# Patient Record
Sex: MALE | Race: WHITE | NOT HISPANIC OR LATINO | URBAN - METROPOLITAN AREA
[De-identification: names, ages, dates, MRNs, and addresses within clinical notes are randomized per-mention and may not be internally consistent; named-entity substitution may affect disease eponyms.]

---

## 2017-10-25 ENCOUNTER — RECORDS - HEALTHEAST (OUTPATIENT)
Dept: LAB | Facility: CLINIC | Age: 67
End: 2017-10-25

## 2017-10-25 LAB
CHOLEST SERPL-MCNC: 254 MG/DL
FASTING STATUS PATIENT QL REPORTED: ABNORMAL
HDLC SERPL-MCNC: 53 MG/DL
LDLC SERPL CALC-MCNC: 171 MG/DL
TRIGL SERPL-MCNC: 151 MG/DL

## 2018-04-06 ENCOUNTER — SURGERY - HEALTHEAST (OUTPATIENT)
Dept: SURGERY | Facility: CLINIC | Age: 68
End: 2018-04-06

## 2018-04-06 ENCOUNTER — ANESTHESIA - HEALTHEAST (OUTPATIENT)
Dept: SURGERY | Facility: CLINIC | Age: 68
End: 2018-04-06

## 2018-04-06 ENCOUNTER — COMMUNICATION - HEALTHEAST (OUTPATIENT)
Dept: SCHEDULING | Facility: CLINIC | Age: 68
End: 2018-04-06

## 2018-04-06 ASSESSMENT — MIFFLIN-ST. JEOR: SCORE: 1563.35

## 2018-04-07 ASSESSMENT — MIFFLIN-ST. JEOR: SCORE: 1537.38

## 2018-04-08 ASSESSMENT — MIFFLIN-ST. JEOR: SCORE: 1584.1

## 2018-10-26 ENCOUNTER — RECORDS - HEALTHEAST (OUTPATIENT)
Dept: LAB | Facility: CLINIC | Age: 68
End: 2018-10-26

## 2018-10-26 LAB
CHOLEST SERPL-MCNC: 250 MG/DL
FASTING STATUS PATIENT QL REPORTED: ABNORMAL
HDLC SERPL-MCNC: 53 MG/DL
LDLC SERPL CALC-MCNC: 141 MG/DL
TRIGL SERPL-MCNC: 281 MG/DL

## 2019-03-20 ENCOUNTER — RECORDS - HEALTHEAST (OUTPATIENT)
Dept: LAB | Facility: CLINIC | Age: 69
End: 2019-03-20

## 2019-03-20 LAB
CHOLEST SERPL-MCNC: 211 MG/DL
FASTING STATUS PATIENT QL REPORTED: ABNORMAL
HDLC SERPL-MCNC: 58 MG/DL
LDLC SERPL CALC-MCNC: 108 MG/DL
TRIGL SERPL-MCNC: 226 MG/DL

## 2020-08-25 ENCOUNTER — RECORDS - HEALTHEAST (OUTPATIENT)
Dept: LAB | Facility: CLINIC | Age: 70
End: 2020-08-25

## 2020-08-25 LAB
CHOLEST SERPL-MCNC: 204 MG/DL
FASTING STATUS PATIENT QL REPORTED: ABNORMAL
HDLC SERPL-MCNC: 44 MG/DL
LDLC SERPL CALC-MCNC: 105 MG/DL
LDLC SERPL CALC-MCNC: ABNORMAL MG/DL
TRIGL SERPL-MCNC: 525 MG/DL

## 2020-09-09 ENCOUNTER — COMMUNICATION - HEALTHEAST (OUTPATIENT)
Dept: UROLOGY | Facility: CLINIC | Age: 70
End: 2020-09-09

## 2020-09-09 ENCOUNTER — SURGERY - HEALTHEAST (OUTPATIENT)
Dept: SURGERY | Facility: CLINIC | Age: 70
End: 2020-09-09

## 2020-09-09 ENCOUNTER — ANESTHESIA - HEALTHEAST (OUTPATIENT)
Dept: SURGERY | Facility: CLINIC | Age: 70
End: 2020-09-09

## 2020-09-09 ENCOUNTER — AMBULATORY - HEALTHEAST (OUTPATIENT)
Dept: UROLOGY | Facility: CLINIC | Age: 70
End: 2020-09-09

## 2020-09-09 DIAGNOSIS — N20.1 CALCULUS OF URETER: ICD-10-CM

## 2020-09-15 ENCOUNTER — AMBULATORY - HEALTHEAST (OUTPATIENT)
Dept: UROLOGY | Facility: CLINIC | Age: 70
End: 2020-09-15

## 2020-09-15 ENCOUNTER — AMBULATORY - HEALTHEAST (OUTPATIENT)
Dept: SURGERY | Facility: CLINIC | Age: 70
End: 2020-09-15

## 2020-09-15 DIAGNOSIS — Z11.59 ENCOUNTER FOR SCREENING FOR OTHER VIRAL DISEASES: ICD-10-CM

## 2020-09-15 DIAGNOSIS — N20.1 CALCULUS OF URETER: ICD-10-CM

## 2020-09-18 ENCOUNTER — RECORDS - HEALTHEAST (OUTPATIENT)
Dept: LAB | Facility: CLINIC | Age: 70
End: 2020-09-18

## 2020-09-18 LAB
ANION GAP SERPL CALCULATED.3IONS-SCNC: 10 MMOL/L (ref 5–18)
BUN SERPL-MCNC: 16 MG/DL (ref 8–22)
CALCIUM SERPL-MCNC: 9.5 MG/DL (ref 8.5–10.5)
CHLORIDE BLD-SCNC: 105 MMOL/L (ref 98–107)
CO2 SERPL-SCNC: 27 MMOL/L (ref 22–31)
CREAT SERPL-MCNC: 0.99 MG/DL (ref 0.7–1.3)
GFR SERPL CREATININE-BSD FRML MDRD: >60 ML/MIN/1.73M2
GLUCOSE BLD-MCNC: 73 MG/DL (ref 70–125)
POTASSIUM BLD-SCNC: 5.1 MMOL/L (ref 3.5–5)
SODIUM SERPL-SCNC: 142 MMOL/L (ref 136–145)

## 2020-09-22 ENCOUNTER — AMBULATORY - HEALTHEAST (OUTPATIENT)
Dept: FAMILY MEDICINE | Facility: CLINIC | Age: 70
End: 2020-09-22

## 2020-09-22 DIAGNOSIS — Z11.59 ENCOUNTER FOR SCREENING FOR OTHER VIRAL DISEASES: ICD-10-CM

## 2020-09-24 ENCOUNTER — ANESTHESIA - HEALTHEAST (OUTPATIENT)
Dept: SURGERY | Facility: CLINIC | Age: 70
End: 2020-09-24

## 2020-09-24 ENCOUNTER — COMMUNICATION - HEALTHEAST (OUTPATIENT)
Dept: SCHEDULING | Facility: CLINIC | Age: 70
End: 2020-09-24

## 2020-09-25 ENCOUNTER — SURGERY - HEALTHEAST (OUTPATIENT)
Dept: SURGERY | Facility: CLINIC | Age: 70
End: 2020-09-25

## 2020-09-25 ASSESSMENT — MIFFLIN-ST. JEOR: SCORE: 1611.82

## 2020-10-22 ENCOUNTER — OFFICE VISIT - HEALTHEAST (OUTPATIENT)
Dept: UROLOGY | Facility: CLINIC | Age: 70
End: 2020-10-22

## 2020-10-22 DIAGNOSIS — N20.1 CALCULUS OF URETER: ICD-10-CM

## 2021-05-27 ENCOUNTER — RECORDS - HEALTHEAST (OUTPATIENT)
Dept: ADMINISTRATIVE | Facility: CLINIC | Age: 71
End: 2021-05-27

## 2021-06-01 VITALS — WEIGHT: 189.7 LBS | BODY MASS INDEX: 29.78 KG/M2 | HEIGHT: 67 IN

## 2021-06-05 VITALS — HEIGHT: 68 IN | BODY MASS INDEX: 29.15 KG/M2 | WEIGHT: 192.31 LBS

## 2021-06-05 VITALS — HEIGHT: 68 IN | BODY MASS INDEX: 30.04 KG/M2 | WEIGHT: 198.2 LBS

## 2021-06-11 ENCOUNTER — RECORDS - HEALTHEAST (OUTPATIENT)
Dept: LAB | Facility: CLINIC | Age: 71
End: 2021-06-11

## 2021-06-11 LAB
ANION GAP SERPL CALCULATED.3IONS-SCNC: 12 MMOL/L (ref 5–18)
BUN SERPL-MCNC: 19 MG/DL (ref 8–22)
CALCIUM SERPL-MCNC: 9.3 MG/DL (ref 8.5–10.5)
CHLORIDE BLD-SCNC: 106 MMOL/L (ref 98–107)
CHOLEST SERPL-MCNC: 189 MG/DL
CO2 SERPL-SCNC: 24 MMOL/L (ref 22–31)
CREAT SERPL-MCNC: 0.97 MG/DL (ref 0.7–1.3)
FASTING STATUS PATIENT QL REPORTED: ABNORMAL
GFR SERPL CREATININE-BSD FRML MDRD: >60 ML/MIN/1.73M2
GLUCOSE BLD-MCNC: 88 MG/DL (ref 70–125)
HDLC SERPL-MCNC: 48 MG/DL
LDLC SERPL CALC-MCNC: 91 MG/DL
POTASSIUM BLD-SCNC: 4.2 MMOL/L (ref 3.5–5)
SODIUM SERPL-SCNC: 142 MMOL/L (ref 136–145)
TRIGL SERPL-MCNC: 251 MG/DL

## 2021-06-11 NOTE — ANESTHESIA CARE TRANSFER NOTE
Last vitals:   Vitals:    09/25/20 0852   BP: 102/72   Pulse: 79   Resp: 14   Temp: 36.7  C (98  F)   SpO2: 94%     Patient's level of consciousness is drowsy  Spontaneous respirations: yes  Maintains airway independently: yes  Dentition unchanged: yes  Oropharynx: oropharynx clear of all foreign objects    QCDR Measures:  ASA# 20 - Surgical Safety Checklist: WHO surgical safety checklist completed prior to induction    PQRS# 430 - Adult PONV Prevention: 4558F - Pt received => 2 anti-emetic agents (different classes) preop & intraop  ASA# 8 - Peds PONV Prevention: NA - Not pediatric patient, not GA or 2 or more risk factors NOT present  PQRS# 424 - Gillian-op Temp Management: 4559F - At least one body temp DOCUMENTED => 35.5C or 95.9F within required timeframe  PQRS# 426 - PACU Transfer Protocol: - Transfer of care checklist used  ASA# 14 - Acute Post-op Pain: ASA14B - Patient did NOT experience pain >= 7 out of 10

## 2021-06-11 NOTE — ANESTHESIA CARE TRANSFER NOTE
Last vitals:   Vitals:    09/09/20 1523   BP: 141/70   Pulse: 76   Resp: 18   Temp: 36.4  C (97.6  F)   SpO2: (!) 89%     Patient's level of consciousness is drowsy  Spontaneous respirations: yes  Maintains airway independently: yes  Dentition unchanged: yes  Oropharynx: oropharynx clear of all foreign objects    QCDR Measures:  ASA# 20 - Surgical Safety Checklist: WHO surgical safety checklist completed prior to induction    PQRS# 430 - Adult PONV Prevention: 4558F - Pt received => 2 anti-emetic agents (different classes) preop & intraop  ASA# 8 - Peds PONV Prevention: NA - Not pediatric patient, not GA or 2 or more risk factors NOT present  PQRS# 424 - Gillian-op Temp Management: 4559F - At least one body temp DOCUMENTED => 35.5C or 95.9F within required timeframe  PQRS# 426 - PACU Transfer Protocol: - Transfer of care checklist used  ASA# 14 - Acute Post-op Pain: ASA14B - Patient did NOT experience pain >= 7 out of 10

## 2021-06-11 NOTE — ANESTHESIA PREPROCEDURE EVALUATION
Anesthesia Evaluation      Patient summary reviewed   History of anesthetic complications (PONV)     Airway   Mallampati: II  Neck ROM: full   Pulmonary - negative ROS and normal exam    breath sounds clear to auscultation  (-) not a smoker                         Cardiovascular   Exercise tolerance: > or = 4 METS  (+) , hypercholesterolemia,     (-) murmur  Rhythm: regular  Rate: normal,    no murmur      Neuro/Psych - negative ROS     Endo/Other    (+) obesity (overweight (29.24)),      GI/Hepatic/Renal    (+) GERD,   chronic renal disease (ureteral stone),      Other findings: 9/9/20: Hgb 15.1, Plt 168  9/18/20: Na 142, K 5.1, BUN 16, Cr 0.99      Dental - normal exam                        Anesthesia Plan  Planned anesthetic: general LMA  9/22/20: 2019-nCOV Not Detected      Preop meds per Urology orders (PO tylenol 1000 mg, Ketorolac 15 mg, gabapentin 300 mg).  GA LMA.  Hx of PONV and plan for scopolamine patch, dexamethasone, zofran and TIVA.    ASA 2   Induction: intravenous   Anesthetic plan and risks discussed with: patient  Anesthesia plan special considerations: antiemetics,   Post-op plan: routine recovery

## 2021-06-11 NOTE — ANESTHESIA POSTPROCEDURE EVALUATION
Patient: Gregory J Schouweiler  Procedure(s):  CYSTOSCOPY, WITH STENT EXTRACTION, RIGID URETEROSCOPIC CALCULUS REMOVAL  AND STENT INSERTION (Right)  Anesthesia type: general    Patient location: Phase II Recovery  Last vitals:   Vitals Value Taken Time   /82 9/25/2020 10:15 AM   Temp 36.7  C (98  F) 9/25/2020  8:52 AM   Pulse 66 9/25/2020 10:24 AM   Resp 21 9/25/2020  9:30 AM   SpO2 96 % 9/25/2020 10:24 AM   Vitals shown include unvalidated device data.  Post vital signs: stable  Level of consciousness: awake, alert and oriented  Post-anesthesia pain: pain controlled  Post-anesthesia nausea and vomiting: no  Pulmonary: unassisted, return to baseline  Cardiovascular: stable and blood pressure at baseline  Hydration: adequate  Anesthetic events: no    QCDR Measures:  ASA# 11 - Gillian-op Cardiac Arrest: ASA11B - Patient did NOT experience unanticipated cardiac arrest  ASA# 12 - Gillian-op Mortality Rate: ASA12B - Patient did NOT die  ASA# 13 - PACU Re-Intubation Rate: ASA13B - Patient did NOT require a new airway mgmt  ASA# 10 - Composite Anes Safety: ASA10A - No serious adverse event    Additional Notes:

## 2021-06-11 NOTE — ANESTHESIA POSTPROCEDURE EVALUATION
Patient: Gregory J Schouweiler  Procedure(s):  CYSTOSCOPY, WITH RIGHT URETERAL STENT INSERTION (Right)  Anesthesia type: MAC    Patient location: [facility] Surgical Floor  Last vitals:   Vitals Value Taken Time   /70 9/9/2020  3:23 PM   Temp 36.4  C (97.6  F) 9/9/2020  3:23 PM   Pulse 76 9/9/2020  3:23 PM   Resp 18 9/9/2020  3:23 PM   SpO2 89 % 9/9/2020  3:23 PM     Post vital signs: stable  Level of consciousness: awake and responds to simple questions  Post-anesthesia pain: pain controlled  Post-anesthesia nausea and vomiting: no  Pulmonary: unassisted, return to baseline  Cardiovascular: stable and blood pressure at baseline  Hydration: adequate  Anesthetic events: no    QCDR Measures:  ASA# 11 - Gillian-op Cardiac Arrest: ASA11B - Patient did NOT experience unanticipated cardiac arrest  ASA# 12 - Gillian-op Mortality Rate: ASA12B - Patient did NOT die  ASA# 13 - PACU Re-Intubation Rate: NA - No ETT / LMA used for case  ASA# 10 - Composite Anes Safety: ASA10A - No serious adverse event    Additional Notes:

## 2021-06-11 NOTE — TELEPHONE ENCOUNTER
Received call from Dr. Haley patel [facility] emergency room regards 69-year-old gentleman who had 2-day history of intermittent chills right flank pain without voiding symptoms.  He was having no pulmonary symptoms but went in and had a COVID test which was negative on Saturday.  Subsequently presented to the emergency room today with UA showing pyuria with greater than 100 cc few bacteria negative nitrate with only a few RBCs.  White blood count was 9600 hemoglobin 15.1.    Personal review of CT scan without contrast obtained today 9/9/2020 demonstrates a 4 x 3.6 stone in the right distal ureter 2 to 3 cm from the UVJ with mild hydroureteronephrosis.  There are no other stones seen.    Patient appears to be infected.  Per discussion with Dr. De Leon will proceed with placement of stent this afternoon at [facility] with patient being admitted at this time.

## 2021-06-11 NOTE — ANESTHESIA PREPROCEDURE EVALUATION
Anesthesia Evaluation      Patient summary reviewed   No history of anesthetic complications     Airway   Mallampati: II  Neck ROM: full   Pulmonary - negative ROS and normal exam                          Cardiovascular - negative ROS and normal exam   Neuro/Psych - negative ROS     Endo/Other - negative ROS      GI/Hepatic/Renal            Dental                         Anesthesia Plan  Planned anesthetic: MAC    ASA 3     Anesthetic plan and risks discussed with: patient

## 2021-06-11 NOTE — PATIENT INSTRUCTIONS - HE
Patient Stated Goal: Know what to expect after surgery  Ureteroscopy    Ureteroscopy is a procedure which is done for clearance of stones from the ureter, kidney or both. There are no incisions involved. The procedure involves your surgeon placing a small scope into your urethra. This is the opening where urine leaves your body.  The surgeon watches as they carefully guide the scope to the stone(s).  Modern flexible ureteroscopes can be used to reach virtually any location within the urinary tract.     The size, shape and location of the stone determines how best to treat the stone(s).  Whenever possible, stones are removed in one piece.  Larger stones need to be broken using a laser before removing in smaller pieces.  The goal is to remove all stones and stone fragments from that side of the body in a single treatment.  Complete stone clearance is an important step to prevent future kidney stone episodes.    Surgery:    Same day outpatient procedure    30-60 minutes    Procedure done in hospital surgical suite    General anesthesia (you will be asleep during the procedure)     Antibiotic prior to surgery to prevent infection    Physician will visit with you and respond to any questions or concerns and consent will be signed prior to going to the operating room    Risks:    Infection - Preoperative antibiotics should prevent new infections but it is possible that unanticipated bacteria may be introduced at time of surgery or that the stones were actually chronically infected before surgery      Injury - The ureter may be injured during this procedure.  This is most likely to happen if the ureter was very inflamed before surgery or if a stone is very tightly impacted.  The surgeon will not aggressively treat a stone if this creates a risk of injury.        Inaccessible Stones -A single procedure is effective in 95% of cases, but if your ureter is very narrow or your kidney stone is very impacted, a stent will be  placed and the procedure stopped.  In 1-2 weeks after the ureter has relaxed, the patient will be brought back to surgery and the procedure can be safely performed.      Incomplete stone clearance -Occasionally stone or stone fragments may not be completely cleared.  These may pass on their own, which may cause discomfort.  Our goal is to remove all possible stones and fragments.    Stent:      An internal soft tube will be placed between the kidney and the bladder while in surgery (after the stone is cleared). The stent will keep the kidney draining.    What should I expect?     It is common for a stent to cause some irritation and discomfort.   You may have:      The need to urinate suddenly     The need to urinate often     Pain during urination     A dull backache, which may get worse during urination     Blood stained urine (like fruit punch) and occasional small clots    It s important to remember the stent is necessary and only temporary. To feel more comfortable:      Drink more than you normally would but you do not have to constantly  flush your kidneys     Limiting your activity may decrease irritation or bleeding    Ibuprofen - 2 tablets every 6-8 hours     Use pain medications as directed.    When is the stent removed?    Most stents are removed within 5 days to 2 weeks after a procedure.     How is the stent removed?     Your stent will be removed in the [facility] with a small telescope and a grasping tool.  It usually takes less than 1 minute to remove the stent.    What should I expect after the stent is removed?     You should feel normal by the next day    Some patients find:    An increase in back pain about an hour after the stent is removed as the kidney fills up with urine before it starts to empty.  It can be as uncomfortable as your initial stone episode.  Taking pain medications before stent removal may be helpful, but you would need someone else to drive you to and from your  appointment.    Bladder symptoms usually disappear by the next morning.    Small amounts of blood in the urine may be seen occasionally for up to a week.    Diet:      After surgery, there are no dietary restrictions - Drink to thirst, there is no need to increase intake of fluids, as this may increase nausea symptoms. Try to eat smaller, more frequent snacks, instead of large meals.    Activity:    Many people return to work within 1-2 days. Fatigue is normal for a couple of weeks following surgery. With increased activity you may experience more discomfort and you may notice more blood in your urine.      Post-Operative Symptom Control    While you recover from your procedure, you can take steps to ease your recovery.    Medications that prevent further episodes of severe pain and help stones pass: Take these as prescribed on a regular basis even if you are NOT in pain      Ibuprofen (Advil or Motrin) - Is available over the counter Take 2 (200mg) tablets every 6 hours until the stone passes.  o prevents spasm of the ureter.    o Decreases pain      Dramamine - (drowsy version, non-generic formulation) Is available over the counter and decreases spasm of the ureter.  Take 50mg at bedtime every night until the stone passes. In addition, take every 6 hours as needed.  Dramamine:  o Decreases nausea  o Decreases acute pain  o Decreases recurrence of pain for next 24 hours  o Will help you sleep        *This medication will cause increased drowsiness, do not drive or operate machinery for 6 hours      Flomax- Studies show that Flomax decreases irritation from stents.   o Take every day with food until stone passes even if you do not have pain  o Flomax does not relieve pain.        *This medication may cause nasal congestion or light-headedness      Detrol ( Tolterodine) - After surgery Detrol may decrease stent irritation and pelvic pain  o Take as prescribed     *This medication may cause dry mouth, constipation or  blurry vision. Stop medication if unable to urinate.    Medication that are taken as needed to manage break through symptoms: Take these ONLY as required and hopefully not at all      Narcotics (Percocet, Vicodin, Dilaudid)- take as prescribed for severe pain unrelieved by ibuprofen and dramamine  o Take as prescribed for severe pain  o Narcotics have significant side effects and only  cover-up  pain. They have no effect on cause of pain.  o Common side effects:  - Confusion, disorientation and sedation - DO NOT DRIVE OR OPERATE MACHINERY WITHIN 24 HOURS  - Nausea - take Dramamine or Zofran  or Haldol to help control  - Constipation  - Sleep disturbances      Ondansetron (Zofran)-  o Take as prescribed  o Reserve for severe nausea  o May cause constipation, start over the counter Miralax if needed to treat this    Haldol-  o Take as prescribed  o Reserve for severe nausea    Warning Signs/Symptoms - Please call the [facility] 24 hours a day at [phone] IMMEDIATELY if you experience any of these:    Fever greater than 100.1     Chills    Pain NOT CONTROLLED by pain medications    Heavy bleeding or large clots in urine (small clots can be normal)    Persistent nausea and/or vomiting    Post-Operative Follow up:    The stone(s) will be sent from surgery to a lab for composition analysis.  These results are usually available before a one month post-operative visit.  If you had laser treatment to break up your stone, you will usually be scheduled for a low dose CT scan prior to your one month appointment.  This scan allows your surgeon to confirm that all stone fragments were cleared at time of surgery and that there have been no complications.  These results along with possible labs and urine studies will help us develop an individualized plan to prevent new stones from forming and keep existing stones from enlarging.  This visit is usually scheduled about 1 month after your original surgery.    The  [facility] can respond to your questions or concerns 24 hours a day at [phone].

## 2021-06-11 NOTE — ANESTHESIA POSTPROCEDURE EVALUATION
Patient: Gregory J Schouweiler  Procedure(s):  CYSTOSCOPY, WITH STENT EXTRACTION, RIGID URETEROSCOPIC CALCULUS REMOVAL  AND STENT INSERTION (Right)  Anesthesia type: general    Patient location: PACU  Last vitals:   Vitals Value Taken Time   /68 9/25/2020  9:00 AM   Temp 36.7  C (98  F) 9/25/2020  8:52 AM   Pulse 71 9/25/2020  9:08 AM   Resp 11 9/25/2020  9:08 AM   SpO2 93 % 9/25/2020  9:08 AM   Vitals shown include unvalidated device data.  Post vital signs: stable  Level of consciousness: awake and responds to simple questions  Post-anesthesia pain: pain controlled  Post-anesthesia nausea and vomiting: no  Pulmonary: unassisted, return to baseline  Cardiovascular: stable and blood pressure at baseline  Hydration: adequate  Anesthetic events: no    QCDR Measures:  ASA# 11 - Gillian-op Cardiac Arrest: ASA11B - Patient did NOT experience unanticipated cardiac arrest  ASA# 12 - Gillian-op Mortality Rate: ASA12B - Patient did NOT die  ASA# 13 - PACU Re-Intubation Rate: ASA13B - Patient did NOT require a new airway mgmt  ASA# 10 - Composite Anes Safety: ASA10A - No serious adverse event    Additional Notes:

## 2021-06-12 NOTE — PATIENT INSTRUCTIONS - HE
Patient Stated Goal: Prevent further stones  Calcium Oxalate Stone Prevention Self Management    Drink more fluids:    Drinking more liquids is the best way you can help prevent future stones. Stones can form when substances in the urine are too concentrated. The more you drink, the more urine you will make. This means all substances in the urine will be less concentrated.    How much urine should I be producing?    The usual recommended daily urine production is about 2 to 3 quarts (8289-3230 ml). If you are producing more than 3 quarts of urine on a regular basis, it is possible to deplete important minerals stored in the body.    To measure the amount of urine you produce in a day, you can either:    Collect all urine in a container and measure at the end of the day     Use a measuring cup each time you urinate and add up the amounts at the end of the day     Observe    Color - Dark suzan urine is concentrated. Light straw color or lighter is dilute and desirable     Odor - Concentrated urine tends to smell stronger. Dilute urine is nearly odorless    Ways to increase your fluid intake    Increasing the amount of fluid you drink is effective for all types of kidney stones. While water is commonly recommended, all fluids are effective for increasing the amount of urine your body produces.    Focus on starting a lifelong habit, rather than a short-term solution.     Keep liquids on hand that you like. Crystal Light is a low calorie appropriate choice.    Drink out of larger glasses. You'll tend to drink more with each serving.     Have an additional glass of fluid with each meal.     Keep a water or drink bottle at work and fill it regularly.     *If you are prone to fluid retention, consult your doctor before making changes to your fluid habits.    Low Oxalate Diet:    Avoid excess amounts or daily consumption of these foods:    All nuts and nut products including peanuts, almonds, pecans, peanut butter, almond  milk    Rhubarb    Chocolate    Soybeans and soy products     Spinach    Wheat Germ    Beets    Maintain a normal calcium diet:    Researches have found that people with low calcium intakes tend to have more stones. Foods with high calcium content are acceptable and include:    Dairy products (including milk, cheese and yogurt)    Meat and fish    Enriched cereals    Dark green vegetables    What about calcium supplements?     Many people take calcium supplements, either on their own or as prescribed by a doctor. Research has indicated that calcium supplements do not usually pose a risk for stone formation.  Calcium citrate is a better choice for a supplement.    Avoid excess salt:    Salt (sodium chloride) is found in abundance in many foods. High sodium levels in the urine can interfere with the kidney's handling of calcium.     Tips for reducing the salt in your diet:    Don't use salt at the table    Reduce the salt used in food preparation. Try 1/2 teaspoon when recipes call for 1 teaspoon.    Use herbs and spices for flavoring instead of salt.    Avoid salty foods.    Check the label before you buy or use a product. Note sodium and portion size information.    Try to consume less than 2,000 mg/day. (1 teaspoon = 2,000 mg)    Foods with high sodium content include:    Processed meat (including luncheon meats, sausage)     Crackers     Instant cereal     Processed cheese     Canned soups     Chips and snack foods     Soy sauce    The [facility] can respond to your questions or concerns 24 hours a day at [phone].

## 2021-06-12 NOTE — PROGRESS NOTES
"Assessment/Plan:        Diagnoses and all orders for this visit:    Calculus of ureter  -     Patient Stated Goal: Prevent further stones  -     Calcium Oxalate Stone Prevention Education      Stone Management Plan  [facility] 10/22/2020   Urinary Tract Infection No suspicion of infection   Renal Colic Asymptomatic at this time   Renal Failure No suspicion of renal failure   R Stone Event Resolved event   Resolved date 10/22/2020   R Post-op status No imaging   L Stone Event No current event             Phone call duration: 12 minutes    Morris Turner MD     Subjective:      The patient has been notified of following:     \"This telephone visit will be conducted via a call between you and your physician/provider. We have found that certain health care needs can be provided without the need for a physical exam.  This service lets us provide the care you need with a short phone conversation.  If a prescription is necessary we can send it directly to your pharmacy.  If labs and/or imaging are needed, we can place orders so you can have the test (s) done at a later time.    If during the course of the call the physician/provider feels a telephone visit is not appropriate, you will not be charged for this service.\"     HPI  Mr. Gregory J Schouweiler is a 69 y.o.  male who is being evaluated via a billable telephone visit by [facility] for late postoperative follow-up.     He returns status post Right ureteroscopic extraction for distal ureteral stone. He has had no unanticipated post-operative events.     He is asymptomatic at present. He denies symptoms of fever, chills, flank pain, nausea, vomiting, urinary frequency and dysuria.    Imaging was not performed today because stone was extracted intact and patient is asymptomatic.     Stone composition was 100% calcium oxalate.     He is a low risk first time stone former and was educated on conservative strategies for " risk reduction       ROS   Review of systems is negative except for HPI.    Past Medical History:   Diagnosis Date     BPH (benign prostatic hyperplasia)      Esophageal reflux      Esophageal stricture     Last dilation 2005     GERD (gastroesophageal reflux disease)      Glaucoma      Hyperlipidemia      Obesity      PONV (postoperative nausea and vomiting)        Past Surgical History:   Procedure Laterality Date     COLONOSCOPY       ESOPHAGOGASTRODUODENOSCOPY      With dilation x2     HERNIA REPAIR       KNEE ARTHROSCOPY Right      LAPAROSCOPIC CHOLECYSTECTOMY N/A 4/6/2018    Procedure: CHOLECYSTECTOMY, LAPAROSCOPIC;  Surgeon: Jean Carlos Wilson MD;  Location: [facility] OR;  Service:      NE CYSTOSCOPY,INSERT URETERAL STENT Right 9/9/2020    Procedure: CYSTOSCOPY, WITH RIGHT URETERAL STENT INSERTION;  Surgeon: Sagar Russell MD;  Location: [facility] OR;  Service: Urology     NE CYSTOSCOPY,INSERT URETERAL STENT Right 9/25/2020    Procedure: CYSTOSCOPY, WITH STENT EXTRACTION, RIGID URETEROSCOPIC CALCULUS REMOVAL;  Surgeon: Morris Turner MD;  Location: [facility] Main OR;  Service: Urology     TONSILLECTOMY       TOTAL KNEE ARTHROPLASTY Right      TRANSURETHRAL RESECTION OF PROSTATE       UMBILICAL HERNIA REPAIR N/A 3/9/2015    Procedure: HERNIA REPAIR UMBILICAL;  Surgeon: Jean Carlos Wilson MD;  Location: [place] Main OR;  Service:        Current Outpatient Medications   Medication Sig Dispense Refill     acetaminophen (TYLENOL) 500 MG tablet Take 1-2 tablets (500-1,000 mg total) by mouth every 4 (four) hours as needed.  0     amoxicillin (AMOXIL) 500 MG capsule Take 2,000 mg by mouth as needed (1 hour before Dental appointments).       finasteride (PROSCAR) 5 mg tablet Take 5 mg by mouth daily.       glucosamine-chondroitin 500-400 mg cap Take 1 capsule by mouth daily.       latanoprost (XALATAN) 0.005 % ophthalmic solution Administer 1 drop to both eyes at bedtime.  3     multivitamin  therapeutic (THERAGRAN) tablet Take 1 tablet by mouth daily.       naproxen sodium (ALEVE) 220 MG tablet Take 220 mg by mouth 2 (two) times a day with meals.       omeprazole (PRILOSEC) 20 MG capsule Take 20 mg by mouth daily.       pravastatin (PRAVACHOL) 20 MG tablet Take 20 mg by mouth at bedtime.       senna-docusate (SENNOSIDES-DOCUSATE SODIUM) 8.6-50 mg tablet Take 1 tablet by mouth 2 (two) times a day.       No current facility-administered medications for this visit.        No Known Allergies    Social History     Socioeconomic History     Marital status:      Spouse name: Not on file     Number of children: Not on file     Years of education: Not on file     Highest education level: Not on file   Occupational History     Not on file   Social Needs     Financial resource strain: Not on file     Food insecurity     Worry: Not on file     Inability: Not on file     Transportation needs     Medical: Not on file     Non-medical: Not on file   Tobacco Use     Smoking status: Never Smoker     Smokeless tobacco: Never Used   Substance and Sexual Activity     Alcohol use: No     Drug use: No     Sexual activity: Not on file   Lifestyle     Physical activity     Days per week: Not on file     Minutes per session: Not on file     Stress: Not on file   Relationships     Social connections     Talks on phone: Not on file     Gets together: Not on file     Attends [employer] service: Not on file     Active member of club or organization: Not on file     Attends meetings of clubs or organizations: Not on file     Relationship status: Not on file     Intimate partner violence     Fear of current or ex partner: Not on file     Emotionally abused: Not on file     Physically abused: Not on file     Forced sexual activity: Not on file   Other Topics Concern     Not on file   Social History Narrative     Not on file       Family History   Problem Relation Age of Onset     Heart attack Mother      Anesthesia problems Neg  Hx      Clotting disorder Neg Hx      Nephrolithiasis Neg Hx        Objective:      Labs   Urinalysis POC (Office):  Nitrite, UA   Date Value Ref Range Status   09/09/2020 Negative Negative Final   04/06/2018 Negative Negative Final       Lab Urinalysis:  Blood, UA   Date Value Ref Range Status   09/09/2020 Moderate (!) Negative Final   04/06/2018 Negative Negative Final     Nitrite, UA   Date Value Ref Range Status   09/09/2020 Negative Negative Final   04/06/2018 Negative Negative Final     Leukocytes, UA   Date Value Ref Range Status   09/09/2020 Large (!) Negative Final   04/06/2018 Negative Negative Final     pH, UA   Date Value Ref Range Status   09/09/2020 5.0 4.5 - 8.0 Final   04/06/2018 6.0 4.5 - 8.0 Final    and Acute Labs   Renal Panel  [facility]  Creatinine   Date Value Ref Range Status   09/18/2020 0.99 0.70 - 1.30 mg/dL Final   09/09/2020 1.73 (H) 0.70 - 1.30 mg/dL Final   04/08/2018 1.08 0.70 - 1.30 mg/dL Final     Potassium   Date Value Ref Range Status   09/25/2020 3.9 3.5 - 5.0 mmol/L Final   09/18/2020 5.1 (H) 3.5 - 5.0 mmol/L Final   09/09/2020 3.6 3.5 - 5.0 mmol/L Final     Calcium   Date Value Ref Range Status   09/18/2020 9.5 8.5 - 10.5 mg/dL Final   09/09/2020 9.0 8.5 - 10.5 mg/dL Final   04/08/2018 8.7 8.5 - 10.5 mg/dL Final

## 2021-06-16 PROBLEM — N20.1 URETERAL STONE: Status: ACTIVE | Noted: 2020-09-09

## 2021-06-16 PROBLEM — N20.1 CALCULUS OF URETER: Status: ACTIVE | Noted: 2020-09-09

## 2021-06-16 PROBLEM — N20.0 NEPHROLITHIASIS: Status: ACTIVE | Noted: 2020-09-10

## 2021-06-16 PROBLEM — K81.0 ACUTE CHOLECYSTITIS: Status: ACTIVE | Noted: 2018-04-06

## 2021-06-17 NOTE — ANESTHESIA CARE TRANSFER NOTE
Last vitals:   Vitals:    04/06/18 1835   BP: 130/63   Pulse: 73   Resp: 16   Temp: 37.1  C (98.7  F)   SpO2: 97%     Patient's level of consciousness is drowsy  Spontaneous respirations: yes  Maintains airway independently: yes  Dentition unchanged: yes  Oropharynx: oropharynx clear of all foreign objects    QCDR Measures:  ASA# 20 - Surgical Safety Checklist: WHO surgical safety checklist completed prior to induction  PQRS# 430 - Adult PONV Prevention: 4558F - Pt received => 2 anti-emetic agents (different classes) preop & intraop  ASA# 8 - Peds PONV Prevention: NA - Not pediatric patient, not GA or 2 or more risk factors NOT present  PQRS# 424 - Gillian-op Temp Management: 4559F - At least one body temp DOCUMENTED => 35.5C or 95.9F within required timeframe  PQRS# 426 - PACU Transfer Protocol: - Transfer of care checklist used  ASA# 14 - Acute Post-op Pain: ASA14B - Patient did NOT experience pain >= 7 out of 10

## 2021-06-17 NOTE — ANESTHESIA PREPROCEDURE EVALUATION
Anesthesia Evaluation      Patient summary reviewed   History of anesthetic complications     Airway   Mallampati: II  Neck ROM: full   Pulmonary - negative ROS and normal exam    breath sounds clear to auscultation                         Cardiovascular - negative ROS and normal exam  Rhythm: regular  Rate: normal,         Neuro/Psych      Endo/Other       GI/Hepatic/Renal - negative ROS      Other findings: PONV      Dental - normal exam                        Anesthesia Plan  Planned anesthetic: general endotracheal    ASA 2 - emergent     Anesthetic plan and risks discussed with: patient    Post-op plan: routine recovery

## 2021-06-17 NOTE — ANESTHESIA POSTPROCEDURE EVALUATION
Patient: Gregory J Schouweiler  CHOLECYSTECTOMY, LAPAROSCOPIC  Anesthesia type: general    Patient location: PACU  Last vitals:   Vitals:    04/06/18 1840   BP: 131/61   Pulse: 72   Resp: 24   Temp:    SpO2: 99%     Post vital signs: stable  Level of consciousness: awake, alert, oriented and responds to simple questions  Post-anesthesia pain: pain controlled  Post-anesthesia nausea and vomiting: no  Pulmonary: unassisted, return to baseline, nasal cannula  Cardiovascular: stable and blood pressure at baseline  Hydration: adequate  Anesthetic events: no    QCDR Measures:  ASA# 11 - Gillian-op Cardiac Arrest: ASA11B - Patient did NOT experience unanticipated cardiac arrest  ASA# 12 - Gillian-op Mortality Rate: ASA12B - Patient did NOT die  ASA# 13 - PACU Re-Intubation Rate: ASA13B - Patient did NOT require a new airway mgmt  ASA# 10 - Composite Anes Safety: ASA10A - No serious adverse event    Additional Notes:

## 2021-06-17 NOTE — ANESTHESIA POSTPROCEDURE EVALUATION
Patient: Gregory J Schouweiler  CHOLECYSTECTOMY, LAPAROSCOPIC  Anesthesia type: general    Patient location: PACU  Last vitals:   Vitals:    04/06/18 1850   BP: 138/68   Pulse: 76   Resp: 26   Temp:    SpO2: 93%     Post vital signs: stable  Level of consciousness: awake, alert, oriented and responds to simple questions  Post-anesthesia pain: pain controlled  Post-anesthesia nausea and vomiting: no  Pulmonary: unassisted, return to baseline, nasal cannula  Cardiovascular: stable and blood pressure at baseline  Hydration: adequate  Anesthetic events: no    QCDR Measures:  ASA# 11 - Gillian-op Cardiac Arrest: ASA11B - Patient did NOT experience unanticipated cardiac arrest  ASA# 12 - Gillian-op Mortality Rate: ASA12B - Patient did NOT die  ASA# 13 - PACU Re-Intubation Rate: ASA13B - Patient did NOT require a new airway mgmt  ASA# 10 - Composite Anes Safety: ASA10A - No serious adverse event    Additional Notes:

## 2023-11-24 ENCOUNTER — LAB REQUISITION (OUTPATIENT)
Dept: LAB | Facility: CLINIC | Age: 73
End: 2023-11-24

## 2023-11-24 DIAGNOSIS — E78.2 MIXED HYPERLIPIDEMIA: ICD-10-CM

## 2023-11-24 LAB
CHOLEST SERPL-MCNC: 209 MG/DL
HDLC SERPL-MCNC: 49 MG/DL
LDLC SERPL CALC-MCNC: 93 MG/DL
NONHDLC SERPL-MCNC: 160 MG/DL
TRIGL SERPL-MCNC: 336 MG/DL

## 2023-11-24 PROCEDURE — 80061 LIPID PANEL: CPT | Performed by: FAMILY MEDICINE

## 2024-04-19 ENCOUNTER — LAB REQUISITION (OUTPATIENT)
Dept: LAB | Facility: CLINIC | Age: 74
End: 2024-04-19

## 2024-04-19 DIAGNOSIS — I10 ESSENTIAL (PRIMARY) HYPERTENSION: ICD-10-CM

## 2024-04-19 PROCEDURE — 80053 COMPREHEN METABOLIC PANEL: CPT | Performed by: NURSE PRACTITIONER

## 2024-04-19 PROCEDURE — 80061 LIPID PANEL: CPT | Performed by: NURSE PRACTITIONER

## 2024-04-21 LAB
ALBUMIN SERPL BCG-MCNC: 4.7 G/DL (ref 3.5–5.2)
ALP SERPL-CCNC: 105 U/L (ref 40–150)
ALT SERPL W P-5'-P-CCNC: 26 U/L (ref 0–70)
ANION GAP SERPL CALCULATED.3IONS-SCNC: 11 MMOL/L (ref 7–15)
AST SERPL W P-5'-P-CCNC: 29 U/L (ref 0–45)
BILIRUB SERPL-MCNC: 0.3 MG/DL
BUN SERPL-MCNC: 17.8 MG/DL (ref 8–23)
CALCIUM SERPL-MCNC: 10.3 MG/DL (ref 8.8–10.2)
CHLORIDE SERPL-SCNC: 103 MMOL/L (ref 98–107)
CHOLEST SERPL-MCNC: 227 MG/DL
CREAT SERPL-MCNC: 1.08 MG/DL (ref 0.67–1.17)
DEPRECATED HCO3 PLAS-SCNC: 26 MMOL/L (ref 22–29)
EGFRCR SERPLBLD CKD-EPI 2021: 73 ML/MIN/1.73M2
FASTING STATUS PATIENT QL REPORTED: ABNORMAL
GLUCOSE SERPL-MCNC: 90 MG/DL (ref 70–99)
HDLC SERPL-MCNC: 46 MG/DL
LDLC SERPL CALC-MCNC: 131 MG/DL
NONHDLC SERPL-MCNC: 181 MG/DL
POTASSIUM SERPL-SCNC: 5.3 MMOL/L (ref 3.4–5.3)
PROT SERPL-MCNC: 7.6 G/DL (ref 6.4–8.3)
SODIUM SERPL-SCNC: 140 MMOL/L (ref 135–145)
TRIGL SERPL-MCNC: 248 MG/DL

## 2025-02-18 ENCOUNTER — LAB REQUISITION (OUTPATIENT)
Dept: LAB | Facility: CLINIC | Age: 75
End: 2025-02-18

## 2025-02-18 DIAGNOSIS — E78.2 MIXED HYPERLIPIDEMIA: ICD-10-CM

## 2025-02-18 DIAGNOSIS — I10 ESSENTIAL (PRIMARY) HYPERTENSION: ICD-10-CM

## 2025-02-18 LAB
ANION GAP SERPL CALCULATED.3IONS-SCNC: 15 MMOL/L (ref 7–15)
BUN SERPL-MCNC: 18.7 MG/DL (ref 8–23)
CALCIUM SERPL-MCNC: 9.6 MG/DL (ref 8.8–10.4)
CHLORIDE SERPL-SCNC: 104 MMOL/L (ref 98–107)
CHOLEST SERPL-MCNC: 202 MG/DL
CREAT SERPL-MCNC: 1.07 MG/DL (ref 0.67–1.17)
EGFRCR SERPLBLD CKD-EPI 2021: 73 ML/MIN/1.73M2
FASTING STATUS PATIENT QL REPORTED: ABNORMAL
FASTING STATUS PATIENT QL REPORTED: NORMAL
GLUCOSE SERPL-MCNC: 87 MG/DL (ref 70–99)
HCO3 SERPL-SCNC: 23 MMOL/L (ref 22–29)
HDLC SERPL-MCNC: 56 MG/DL
LDLC SERPL CALC-MCNC: 105 MG/DL
NONHDLC SERPL-MCNC: 146 MG/DL
POTASSIUM SERPL-SCNC: 4 MMOL/L (ref 3.4–5.3)
SODIUM SERPL-SCNC: 142 MMOL/L (ref 135–145)
TRIGL SERPL-MCNC: 207 MG/DL

## 2025-02-18 PROCEDURE — 82465 ASSAY BLD/SERUM CHOLESTEROL: CPT | Performed by: FAMILY MEDICINE

## 2025-02-18 PROCEDURE — 82374 ASSAY BLOOD CARBON DIOXIDE: CPT | Performed by: FAMILY MEDICINE
